# Patient Record
Sex: MALE | Race: WHITE | Employment: FULL TIME | URBAN - METROPOLITAN AREA
[De-identification: names, ages, dates, MRNs, and addresses within clinical notes are randomized per-mention and may not be internally consistent; named-entity substitution may affect disease eponyms.]

---

## 2021-12-30 ENCOUNTER — HOSPITAL ENCOUNTER (EMERGENCY)
Age: 36
Discharge: HOME | End: 2021-12-30
Payer: COMMERCIAL

## 2021-12-30 VITALS
WEIGHT: 225 LBS | SYSTOLIC BLOOD PRESSURE: 168 MMHG | BODY MASS INDEX: 33.33 KG/M2 | HEIGHT: 69 IN | DIASTOLIC BLOOD PRESSURE: 102 MMHG | OXYGEN SATURATION: 99 % | RESPIRATION RATE: 15 BRPM | TEMPERATURE: 97.8 F | HEART RATE: 80 BPM

## 2021-12-30 DIAGNOSIS — R05.9 COUGH: ICD-10-CM

## 2021-12-30 DIAGNOSIS — Z20.822 CONTACT WITH AND (SUSPECTED) EXPOSURE TO COVID-19: Primary | ICD-10-CM

## 2021-12-30 PROCEDURE — 99283 EMERGENCY DEPT VISIT LOW MDM: CPT

## 2021-12-30 PROCEDURE — U0005 INFEC AGEN DETEC AMPLI PROBE: HCPCS

## 2021-12-30 NOTE — Clinical Note
[facility] EMERGENCY DEPT  [address] [facility] [id number] [phone]    Work/School Note    Date: 12/30/2021     To Whom It May concern:    Jesica Barrios was evaluated by the following provider(s):  Nurse Practitioner: Katie Rodriguez NP.   COVID19 virus is suspected. Per the CDC guidelines we recommend home isolation until the following conditions are all met:    1. At least five days have passed since symptoms first appeared and/or had a close exposure,   2. After home isolation for five days, wearing a mask around others for the next five days,  3. At least 24 have passed since last fever without the use of fever-reducing medications and  4.  Symptoms (eg cough, shortness of breath) have improved      Sincerely,          Bridget Holden NP

## 2021-12-30 NOTE — ED TRIAGE NOTES
Pt here for COVID test, has been exposed to someone with COVID    Pt has hx of HTN, however forgot to take meds today, has meds at home

## 2021-12-30 NOTE — ED PROVIDER NOTES
EMERGENCY DEPARTMENT HISTORY AND PHYSICAL EXAM      Date: 12/30/2021  Patient Name: Emilia Mendez    History of Presenting Illness     Chief Complaint   Patient presents with    Covid Testing       History Provided By: Patient    HPI: Emilia Mendez, 29 y.o. male with a past medical history significant hypertension presents to the ED with cc of known exposure to COVID 2 days ago. Only reports mild, intermittent, nonproductive cough. Specifically denies fever,shortness of breath, chest pain, fatigue, decreased or change in appetite, N/V/D. There are no other complaints, changes, or physical findings at this time. PCP: No primary care provider on file. No current facility-administered medications on file prior to encounter. No current outpatient medications on file prior to encounter. Past History     Past Medical History:  Past Medical History:   Diagnosis Date    Hypertension        Past Surgical History:  No past surgical history on file. Family History:  No family history on file. Social History:  Social History     Tobacco Use    Smoking status: Not on file    Smokeless tobacco: Not on file   Substance Use Topics    Alcohol use: Not on file    Drug use: Not on file       Allergies:  No Known Allergies      Review of Systems     Review of Systems   Constitutional: Negative for appetite change, chills, fatigue and fever. HENT: Negative for ear pain, postnasal drip, rhinorrhea, sinus pressure, sore throat and trouble swallowing. Eyes: Negative for discharge and visual disturbance. Respiratory: Positive for cough. Negative for chest tightness, shortness of breath and wheezing. Cardiovascular: Negative for chest pain, palpitations and leg swelling. Gastrointestinal: Negative for abdominal distention, abdominal pain, blood in stool, constipation, diarrhea, nausea and vomiting. Endocrine: Negative. Genitourinary: Negative for dysuria, frequency and urgency.    Musculoskeletal: Negative for back pain, myalgias, neck pain and neck stiffness. Skin: Negative for rash. Allergic/Immunologic: Negative. Neurological: Negative for dizziness, syncope, weakness and headaches. Hematological: Does not bruise/bleed easily. Psychiatric/Behavioral: Negative for confusion, hallucinations, sleep disturbance and suicidal ideas. The patient is not nervous/anxious. All other systems reviewed and are negative. Physical Exam     Physical Exam  Vitals and nursing note reviewed. Constitutional:       General: He is not in acute distress. Appearance: He is well-developed. He is not ill-appearing. HENT:      Head: Normocephalic and atraumatic. Right Ear: External ear normal.      Left Ear: External ear normal.      Nose: Nose normal.      Mouth/Throat:      Pharynx: Oropharynx is clear. Eyes:      General: No scleral icterus. Conjunctiva/sclera: Conjunctivae normal.   Neck:      Vascular: No JVD. Trachea: No tracheal deviation. Cardiovascular:      Rate and Rhythm: Normal rate and regular rhythm. No extrasystoles are present. Pulses:           Radial pulses are 2+ on the right side and 2+ on the left side. Heart sounds: Normal heart sounds. No murmur heard. Pulmonary:      Effort: Pulmonary effort is normal.      Breath sounds: Normal breath sounds. Chest:      Chest wall: No tenderness. Abdominal:      General: Bowel sounds are normal.      Palpations: Abdomen is soft. Tenderness: There is no abdominal tenderness. There is no guarding or rebound. Musculoskeletal:         General: Normal range of motion. Cervical back: Normal range of motion and neck supple. Right lower leg: No tenderness. No edema. Left lower leg: No tenderness. No edema. Lymphadenopathy:      Cervical: No cervical adenopathy. Skin:     General: Skin is warm and dry. Capillary Refill: Capillary refill takes less than 2 seconds.       Findings: No erythema. Neurological:      General: No focal deficit present. Mental Status: He is alert and oriented to person, place, and time. Motor: No weakness. Psychiatric:         Mood and Affect: Mood normal.         Behavior: Behavior normal.         Lab and Diagnostic Study Results     Labs -   No results found for this or any previous visit (from the past 12 hour(s)). Radiologic Studies -   @lastxrresult@  CT Results  (Last 48 hours)    None        CXR Results  (Last 48 hours)    None            Medical Decision Making   - I am the first provider for this patient. - I reviewed the vital signs, available nursing notes, past medical history, past surgical history, family history and social history. - Initial assessment performed. The patients presenting problems have been discussed, and they are in agreement with the care plan formulated and outlined with them. I have encouraged them to ask questions as they arise throughout their visit. Vital Signs-Reviewed the patient's vital signs. Patient Vitals for the past 12 hrs:   Temp Pulse Resp BP SpO2   12/30/21 1307 97.8 °F (36.6 °C) 80 15 (!) 168/102 99 %       Records Reviewed: Nursing Notes and Old Medical Records    The patient presents with known exposure to COVID with a differential diagnosis of COVID 19, bronchitis, viral URI, cough, hypertension      ED Course:   Patient was seen and evaluated. Explained COVID testing and precautions to follow along with s/s to return and to seek emergent/immediate care for. Patient agreeable with plan and discharge and remained stable with no change in presentation or development of new symptoms during ED stay.         Provider Notes (Medical Decision Making):     MDM  Number of Diagnoses or Management Options  Contact with and (suspected) exposure to covid-19: new, no workup  Cough: new, no workup     Amount and/or Complexity of Data Reviewed  Clinical lab tests: ordered  Discuss the patient with other providers: yes    Risk of Complications, Morbidity, and/or Mortality  Presenting problems: low  Diagnostic procedures: minimal  Management options: minimal    Patient Progress  Patient progress: stable             Disposition   Disposition: Condition stable  DC- Adult Discharges: All of the diagnostic tests were reviewed and questions answered. Diagnosis, care plan and treatment options were discussed. The patient understands the instructions and will follow up as directed. The patients results have been reviewed with them. They have been counseled regarding their diagnosis. The patient verbally convey understanding and agreement of the signs, symptoms, diagnosis, treatment and prognosis and additionally agrees to follow up as recommended with their PCP in 24 - 48 hours. They also agree with the care-plan and convey that all of their questions have been answered. I have also put together some discharge instructions for them that include: 1) educational information regarding their diagnosis, 2) how to care for their diagnosis at home, as well a 3) list of reasons why they would want to return to the ED prior to their follow-up appointment, should their condition change. DISCHARGE PLAN:  1. There are no discharge medications for this patient. 2.   Follow-up Information     Follow up With Specialties Details Why Contact Info    Follow up with primary care, urgent care, or this Emergency department   As needed, If symptoms worsen         3. Return to ED if worse   4. There are no discharge medications for this patient. Diagnosis     Clinical Impression:   1. Contact with and (suspected) exposure to covid-19    2. Cough        Attestations:    Juan Quezada NP    Please note that this dictation was completed with [employer], the computer voice recognition software.   Quite often unanticipated grammatical, syntax, homophones, and other interpretive errors are inadvertently transcribed by the computer software. Please disregard these errors. Please excuse any errors that have escaped final proofreading. Thank you.

## 2021-12-31 LAB — SARS-COV-2, COV2: NORMAL

## 2022-01-02 LAB
SARS-COV-2, XPLCVT: NOT DETECTED
SOURCE, COVRS: NORMAL